# Patient Record
Sex: FEMALE | URBAN - METROPOLITAN AREA
[De-identification: names, ages, dates, MRNs, and addresses within clinical notes are randomized per-mention and may not be internally consistent; named-entity substitution may affect disease eponyms.]

---

## 2018-02-26 ENCOUNTER — APPOINTMENT (RX ONLY)
Age: 63
Setting detail: DERMATOLOGY
End: 2018-02-26

## 2018-02-26 DIAGNOSIS — D22 MELANOCYTIC NEVI: ICD-10-CM

## 2018-02-26 DIAGNOSIS — L72.8 OTHER FOLLICULAR CYSTS OF THE SKIN AND SUBCUTANEOUS TISSUE: ICD-10-CM

## 2018-02-26 DIAGNOSIS — D86.3 SARCOIDOSIS OF SKIN: ICD-10-CM

## 2018-02-26 DIAGNOSIS — L81.4 OTHER MELANIN HYPERPIGMENTATION: ICD-10-CM

## 2018-02-26 PROBLEM — D22.5 MELANOCYTIC NEVI OF TRUNK: Status: ACTIVE | Noted: 2018-02-26

## 2018-02-26 PROCEDURE — 99203 OFFICE O/P NEW LOW 30 MIN: CPT

## 2018-02-26 PROCEDURE — ? COUNSELING

## 2018-02-26 PROCEDURE — ? PRESCRIPTION

## 2018-02-26 PROCEDURE — ? DIAGNOSIS COMMENT

## 2018-02-26 RX ORDER — HYDROXYCHLOROQUINE SULFATE 200 MG/1
TABLET ORAL
Qty: 30 | Refills: 4 | Status: ERX | COMMUNITY
Start: 2018-02-26

## 2018-02-26 RX ADMIN — HYDROXYCHLOROQUINE SULFATE: 200 TABLET ORAL at 00:00

## 2018-02-26 ASSESSMENT — LOCATION DETAILED DESCRIPTION DERM
LOCATION DETAILED: LEFT CENTRAL OCCIPITAL SCALP
LOCATION DETAILED: STERNAL NOTCH
LOCATION DETAILED: INFERIOR THORACIC SPINE

## 2018-02-26 ASSESSMENT — LOCATION ZONE DERM
LOCATION ZONE: SCALP
LOCATION ZONE: TRUNK

## 2018-02-26 ASSESSMENT — LOCATION SIMPLE DESCRIPTION DERM
LOCATION SIMPLE: UPPER BACK
LOCATION SIMPLE: SCALP
LOCATION SIMPLE: CHEST

## 2018-02-26 NOTE — HPI: FREE FORM (INITIAL HISTORY)
How Severe Is Your Skin Condition? (The Patient Describes The Severity Level As....): mild
What Brings You In Today? (This Is An Xx Year Old Patient Who Presents For...): sarcoidosis
When Did You First Notice It? (The Patient First Noticed It...): years ago
Where On Your Body Is It? (Located On...): currently she does not have any outbreaks
Any Associated Symptoms? (The Symptoms Include.....): previously raised bumps, none today
What Previous Treatments Have You Tried? (The Patient Has Tried The Following Treatments...): prednisone, ILK, currently on 200 mg of Plaquenil daily
Did Anything Happen To Make You Want To Come In For This Specifically Today? (The Specific Reason That The Patient Came In Today Was Because:): since she has not had any cutaneous flares in over 2.5 years, she would like to discuss whether she needs to continue Plaquenil. She has never had sarcoidosis in any other organ, other than her skin. She was previously followed by Dr. Angelo Lindo at .\\nShe would also like a full skin exam. She would like to discuss a long-standing cyst on her scalp.

## 2018-02-26 NOTE — PROCEDURE: DIAGNOSIS COMMENT
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  Would refer to Dr. Lawson for excision if she would like to have it removed. She will call back to schedule if she would like. She will otherwise notify clinic with changes.
Comment: She does not have any lesions of sarcoidosis on her skin today and has not for over 2.5 years.\\n- decrease Plaquenil to 200 mg every other day. Can increase back to daily if she flares\\n- continue annual eye exams\\n- check labs\\n- f/u 6 months, sooner if needed
Detail Level: Generalized
Detail Level: Zone

## 2018-03-13 ENCOUNTER — APPOINTMENT (RX ONLY)
Age: 63
Setting detail: DERMATOLOGY
End: 2018-03-13

## 2018-03-13 DIAGNOSIS — Z79.899 OTHER LONG TERM (CURRENT) DRUG THERAPY: ICD-10-CM

## 2018-03-13 PROCEDURE — ? ORDER TESTS

## 2018-04-12 ENCOUNTER — APPOINTMENT (RX ONLY)
Age: 63
Setting detail: DERMATOLOGY
End: 2018-04-12

## 2018-04-12 DIAGNOSIS — L72.8 OTHER FOLLICULAR CYSTS OF THE SKIN AND SUBCUTANEOUS TISSUE: ICD-10-CM

## 2018-04-12 PROCEDURE — 12031 INTMD RPR S/A/T/EXT 2.5 CM/<: CPT

## 2018-04-12 PROCEDURE — 11422 EXC H-F-NK-SP B9+MARG 1.1-2: CPT

## 2018-04-12 PROCEDURE — ? PATIENT SPECIFIC COUNSELING

## 2018-04-12 PROCEDURE — ? EXCISION

## 2018-04-12 PROCEDURE — ? PRESCRIPTION

## 2018-04-12 RX ORDER — HYDROCODONE BITATRATE AND ACETAMINOPHEN 5; 325 MG/1; MG/1
TABLET ORAL
Qty: 4 | Refills: 0 | COMMUNITY
Start: 2018-04-12

## 2018-04-12 RX ADMIN — HYDROCODONE BITATRATE AND ACETAMINOPHEN: 5; 325 TABLET ORAL at 00:00

## 2018-04-12 ASSESSMENT — LOCATION DETAILED DESCRIPTION DERM: LOCATION DETAILED: LEFT CENTRAL OCCIPITAL SCALP

## 2018-04-12 ASSESSMENT — LOCATION SIMPLE DESCRIPTION DERM: LOCATION SIMPLE: SCALP

## 2018-04-12 ASSESSMENT — LOCATION ZONE DERM: LOCATION ZONE: SCALP

## 2018-04-25 ENCOUNTER — APPOINTMENT (RX ONLY)
Age: 63
Setting detail: DERMATOLOGY
End: 2018-04-25

## 2018-04-25 DIAGNOSIS — Z48.02 ENCOUNTER FOR REMOVAL OF SUTURES: ICD-10-CM

## 2018-04-25 PROCEDURE — 99211 OFF/OP EST MAY X REQ PHY/QHP: CPT

## 2018-04-25 PROCEDURE — ? SUTURE REMOVAL (NO GLOBAL PERIOD)

## 2018-04-25 ASSESSMENT — LOCATION SIMPLE DESCRIPTION DERM: LOCATION SIMPLE: SCALP

## 2018-04-25 ASSESSMENT — LOCATION DETAILED DESCRIPTION DERM: LOCATION DETAILED: LEFT CENTRAL OCCIPITAL SCALP

## 2018-04-25 ASSESSMENT — LOCATION ZONE DERM: LOCATION ZONE: SCALP

## 2018-09-04 ENCOUNTER — APPOINTMENT (RX ONLY)
Age: 63
Setting detail: DERMATOLOGY
End: 2018-09-04

## 2018-09-04 DIAGNOSIS — D86.3 SARCOIDOSIS OF SKIN: ICD-10-CM

## 2018-09-04 DIAGNOSIS — L82.1 OTHER SEBORRHEIC KERATOSIS: ICD-10-CM

## 2018-09-04 PROCEDURE — ? DIAGNOSIS COMMENT

## 2018-09-04 PROCEDURE — 99213 OFFICE O/P EST LOW 20 MIN: CPT

## 2018-09-04 PROCEDURE — ? PRESCRIPTION

## 2018-09-04 PROCEDURE — ? COUNSELING

## 2018-09-04 RX ORDER — HYDROXYCHLOROQUINE SULFATE 200 MG/1
TABLET ORAL
Qty: 30 | Refills: 4 | Status: ERX

## 2018-09-04 ASSESSMENT — LOCATION SIMPLE DESCRIPTION DERM: LOCATION SIMPLE: LEFT CALF

## 2018-09-04 ASSESSMENT — LOCATION ZONE DERM: LOCATION ZONE: LEG

## 2018-09-04 ASSESSMENT — LOCATION DETAILED DESCRIPTION DERM: LOCATION DETAILED: LEFT DISTAL CALF

## 2018-09-04 NOTE — PROCEDURE: DIAGNOSIS COMMENT
Detail Level: Generalized
Detail Level: Simple
Comment: The lesion she thought was a new sarcoid lesion is most consistent with a small SK. Notify clinic with changes.
Comment: She does not have any lesions of sarcoidosis on her skin today and has not for over 3 years. Pt was using 200mg every other day then increased to daily because she was concerned a lesion on her left calf was a sarcoidosis lesion.\\n- decrease Plaquenil to 200 mg every other day again. Can increase back to daily if she flares\\n- continue monitoring with pulmonology and ophthalmology \\n- f/u 6 months, sooner if needed. \\n- will check labs in 6 months at next exam.

## 2018-09-04 NOTE — HPI: FREE FORM (FOLLOW UP HISTORY)
What Brings You In Today For Follow Up? (This Is An Xx Year Old Patient Who Is Following Up For:): cutaneous sarcoidosis
What Was It Treated With? (The Condition Was Treated With:): Plaquenil
How Did You Use It? (The Patient Used The Treatment In The Following Way:): 200mg qd since April
Since The Treatment Is Your Condition Better, Worse, Or Unchanged? (Since The Treatment, The Condition Is:): stable, no lesions

## 2019-04-23 ENCOUNTER — APPOINTMENT (RX ONLY)
Age: 64
Setting detail: DERMATOLOGY
End: 2019-04-23

## 2019-04-23 DIAGNOSIS — Z79.899 OTHER LONG TERM (CURRENT) DRUG THERAPY: ICD-10-CM

## 2019-04-23 DIAGNOSIS — L81.4 OTHER MELANIN HYPERPIGMENTATION: ICD-10-CM

## 2019-04-23 DIAGNOSIS — D86.3 SARCOIDOSIS OF SKIN: ICD-10-CM

## 2019-04-23 DIAGNOSIS — D22 MELANOCYTIC NEVI: ICD-10-CM

## 2019-04-23 PROBLEM — D22.5 MELANOCYTIC NEVI OF TRUNK: Status: ACTIVE | Noted: 2019-04-23

## 2019-04-23 PROCEDURE — ? ORDER TESTS

## 2019-04-23 PROCEDURE — ? OTHER

## 2019-04-23 PROCEDURE — ? DIAGNOSIS COMMENT

## 2019-04-23 PROCEDURE — ? COUNSELING

## 2019-04-23 PROCEDURE — 99214 OFFICE O/P EST MOD 30 MIN: CPT

## 2019-04-23 PROCEDURE — ? PRESCRIPTION

## 2019-04-23 RX ORDER — HYDROXYCHLOROQUINE SULFATE 200 MG/1
TABLET ORAL
Qty: 45 | Refills: 1 | Status: ERX

## 2019-04-23 ASSESSMENT — LOCATION ZONE DERM
LOCATION ZONE: TRUNK
LOCATION ZONE: ARM

## 2019-04-23 ASSESSMENT — LOCATION DETAILED DESCRIPTION DERM
LOCATION DETAILED: LEFT SUPERIOR UPPER BACK
LOCATION DETAILED: RIGHT POSTERIOR SHOULDER

## 2019-04-23 ASSESSMENT — LOCATION SIMPLE DESCRIPTION DERM
LOCATION SIMPLE: RIGHT SHOULDER
LOCATION SIMPLE: LEFT UPPER BACK

## 2019-04-23 NOTE — PROCEDURE: DIAGNOSIS COMMENT
Detail Level: Simple
Comment: No lesions c/w sarcoidosis. Decrease Plaquenil from every other day to M-W-F (small decrease.) Notify clinic with new lesions. Check labs.

## 2019-04-23 NOTE — PROCEDURE: OTHER
Detail Level: Zone
Note Text (......Xxx Chief Complaint.): This diagnosis correlates with the
Other (Free Text): --some nevi have some mild clinical atypia but none appear concerning for melanoma

## 2019-04-23 NOTE — HPI: FREE FORM (FOLLOW UP HISTORY)
How Severe Is Your Skin Condition?: mild
What Brings You In Today For Follow Up? (This Is An Xx Year Old Patient Who Is Following Up For:): sarcoidosis
What Was It Treated With? (The Condition Was Treated With:): Plaquenil 200 mg every other day
Did You Use The Treatment As Prescribed Or Directed? (The Patient Used The Treatment As Prescribed Or Directed): yes
Since The Treatment Is Your Condition Better, Worse, Or Unchanged? (Since The Treatment, The Condition Is:): stable, no lesions. She has not had any lesions for years.

## 2019-10-31 ENCOUNTER — APPOINTMENT (RX ONLY)
Age: 64
Setting detail: DERMATOLOGY
End: 2019-10-31

## 2019-10-31 DIAGNOSIS — D86.3 SARCOIDOSIS OF SKIN: ICD-10-CM

## 2019-10-31 DIAGNOSIS — Z79.899 OTHER LONG TERM (CURRENT) DRUG THERAPY: ICD-10-CM

## 2019-10-31 PROCEDURE — 99213 OFFICE O/P EST LOW 20 MIN: CPT

## 2019-10-31 PROCEDURE — ? DIAGNOSIS COMMENT

## 2019-10-31 PROCEDURE — ? OTHER

## 2019-10-31 NOTE — HPI: FREE FORM (FOLLOW UP HISTORY)
How Severe Is Your Skin Condition?: mild
What Brings You In Today For Follow Up? (This Is An Xx Year Old Patient Who Is Following Up For:): Sarcoidosis
What Was It Treated With? (The Condition Was Treated With:): Plaquenil 200mg M-W-F
Since The Treatment Is Your Condition Better, Worse, Or Unchanged? (Since The Treatment, The Condition Is:): Stable, she has not had any breakouts since lowering her dose.  She was previously on 200mg every other day.

## 2019-10-31 NOTE — PROCEDURE: OTHER
Note Text (......Xxx Chief Complaint.): This diagnosis correlates with the
Detail Level: Zone
Other (Free Text): --last labs basically normal (lat June.) Will check again in 6 months. She will continue to see ophthalmology every year. SEs of Plaquenil reviewed.

## 2019-10-31 NOTE — PROCEDURE: DIAGNOSIS COMMENT
Detail Level: Simple
Comment: No lesions c/w sarcoidosis. Continue Plaquenil M-W-F.  Notify clinic with new lesions. We will most likely lower her to 200mg twice a week in 6 months. Will check labs at that time. Her pulmonologist recently told her she could f/u as needed. She continues to see ophthalmology annually.

## 2020-03-24 ENCOUNTER — APPOINTMENT (RX ONLY)
Age: 65
Setting detail: DERMATOLOGY
End: 2020-03-24

## 2020-03-24 DIAGNOSIS — D86.3 SARCOIDOSIS OF SKIN: ICD-10-CM

## 2020-03-24 PROCEDURE — ? PRESCRIPTION

## 2020-03-24 RX ORDER — HYDROXYCHLOROQUINE SULFATE 200 MG/1
TABLET ORAL
Qty: 15 | Refills: 4 | Status: ERX

## 2020-05-05 ENCOUNTER — APPOINTMENT (RX ONLY)
Age: 65
Setting detail: DERMATOLOGY
End: 2020-05-05

## 2020-05-05 DIAGNOSIS — D86.3 SARCOIDOSIS OF SKIN: ICD-10-CM

## 2020-05-05 DIAGNOSIS — L85.3 XEROSIS CUTIS: ICD-10-CM

## 2020-05-05 DIAGNOSIS — D22 MELANOCYTIC NEVI: ICD-10-CM

## 2020-05-05 DIAGNOSIS — L82.1 OTHER SEBORRHEIC KERATOSIS: ICD-10-CM

## 2020-05-05 DIAGNOSIS — Z79.899 OTHER LONG TERM (CURRENT) DRUG THERAPY: ICD-10-CM

## 2020-05-05 PROBLEM — D22.5 MELANOCYTIC NEVI OF TRUNK: Status: ACTIVE | Noted: 2020-05-05

## 2020-05-05 PROCEDURE — ? COUNSELING

## 2020-05-05 PROCEDURE — 99214 OFFICE O/P EST MOD 30 MIN: CPT

## 2020-05-05 PROCEDURE — ? DIAGNOSIS COMMENT

## 2020-05-05 PROCEDURE — ? ORDER TESTS

## 2020-05-05 ASSESSMENT — LOCATION SIMPLE DESCRIPTION DERM
LOCATION SIMPLE: ABDOMEN
LOCATION SIMPLE: RIGHT UPPER BACK

## 2020-05-05 ASSESSMENT — LOCATION DETAILED DESCRIPTION DERM
LOCATION DETAILED: RIGHT SUPERIOR MEDIAL UPPER BACK
LOCATION DETAILED: RIGHT MID-UPPER BACK
LOCATION DETAILED: PERIUMBILICAL SKIN

## 2020-05-05 ASSESSMENT — LOCATION ZONE DERM: LOCATION ZONE: TRUNK

## 2020-05-05 NOTE — HPI: FREE FORM (INITIAL HISTORY)
How Severe Is Your Skin Condition? (The Patient Describes The Severity Level As....): moderate
What Brings You In Today? (This Is An Xx Year Old Patient Who Presents For...): rough patches on back
When Did You First Notice It? (The Patient First Noticed It...): weeks ago
Where On Your Body Is It? (Located On...): upper back
Any Associated Symptoms? (The Symptoms Include.....): dry, raised, rough
What Previous Treatments Have You Tried? (The Patient Has Tried The Following Treatments...): OTC moisturizers
Did Anything Happen To Make You Want To Come In For This Specifically Today? (The Specific Reason That The Patient Came In Today Was Because:): evaluation and management, a FSE and f/u on sarcoidosis for which she takes hydroxychloroquine. She takes the hydroxychloroquine M-W-F and the sarcoidosis has been stable. She does not think the new patches on her back are sarcoid, but she would like the area checked.

## 2020-05-05 NOTE — PROCEDURE: DIAGNOSIS COMMENT
Detail Level: Simple
Comment: No current lesions. Continue Plaquenil M-W-F.  Will hold off on changing dose now, will consider again in future. Notify clinic with new lesions. Check labs in next few weeks. Lab slip given. Continue annual eye exams.
Detail Level: Zone
Comment: Patches on upper back are due to xerosis or possibly previous inflamed SKs. Not clinically consistent with sarcoidosis. Notify clinic if these recur. Much better now.

## 2021-04-02 ENCOUNTER — APPOINTMENT (RX ONLY)
Age: 66
Setting detail: DERMATOLOGY
End: 2021-04-02

## 2021-04-02 DIAGNOSIS — D86.3 SARCOIDOSIS OF SKIN: ICD-10-CM

## 2021-04-02 PROCEDURE — ? PRESCRIPTION

## 2021-04-02 RX ORDER — HYDROXYCHLOROQUINE SULFATE 200 MG/1
TABLET ORAL
Qty: 15 | Refills: 4 | Status: ERX

## 2021-04-20 ENCOUNTER — APPOINTMENT (RX ONLY)
Age: 66
Setting detail: DERMATOLOGY
End: 2021-04-20

## 2021-04-20 VITALS — TEMPERATURE: 97.1 F

## 2021-04-20 DIAGNOSIS — D86.3 SARCOIDOSIS OF SKIN: ICD-10-CM

## 2021-04-20 DIAGNOSIS — L81.4 OTHER MELANIN HYPERPIGMENTATION: ICD-10-CM

## 2021-04-20 DIAGNOSIS — L82.1 OTHER SEBORRHEIC KERATOSIS: ICD-10-CM

## 2021-04-20 DIAGNOSIS — Z79.899 OTHER LONG TERM (CURRENT) DRUG THERAPY: ICD-10-CM

## 2021-04-20 DIAGNOSIS — D49.2 NEOPLASM OF UNSPECIFIED BEHAVIOR OF BONE, SOFT TISSUE, AND SKIN: ICD-10-CM

## 2021-04-20 DIAGNOSIS — D22 MELANOCYTIC NEVI: ICD-10-CM

## 2021-04-20 PROBLEM — D22.5 MELANOCYTIC NEVI OF TRUNK: Status: ACTIVE | Noted: 2021-04-20

## 2021-04-20 PROCEDURE — ? OTHER

## 2021-04-20 PROCEDURE — 99213 OFFICE O/P EST LOW 20 MIN: CPT | Mod: 25

## 2021-04-20 PROCEDURE — ? DIAGNOSIS COMMENT

## 2021-04-20 PROCEDURE — ? COUNSELING

## 2021-04-20 PROCEDURE — ? BIOPSY BY SHAVE METHOD

## 2021-04-20 PROCEDURE — 11102 TANGNTL BX SKIN SINGLE LES: CPT

## 2021-04-20 PROCEDURE — ? ORDER TESTS

## 2021-04-20 ASSESSMENT — LOCATION DETAILED DESCRIPTION DERM
LOCATION DETAILED: LEFT CENTRAL MALAR CHEEK
LOCATION DETAILED: LEFT SUPERIOR PARIETAL SCALP
LOCATION DETAILED: RIGHT MEDIAL UPPER BACK
LOCATION DETAILED: STERNAL NOTCH
LOCATION DETAILED: INFERIOR THORACIC SPINE

## 2021-04-20 ASSESSMENT — LOCATION ZONE DERM
LOCATION ZONE: SCALP
LOCATION ZONE: TRUNK
LOCATION ZONE: FACE

## 2021-04-20 ASSESSMENT — LOCATION SIMPLE DESCRIPTION DERM
LOCATION SIMPLE: UPPER BACK
LOCATION SIMPLE: CHEST
LOCATION SIMPLE: LEFT CHEEK
LOCATION SIMPLE: RIGHT UPPER BACK
LOCATION SIMPLE: SCALP

## 2021-04-20 NOTE — PROCEDURE: OTHER
Render Risk Assessment In Note?: no
Detail Level: Simple
Other (Free Text): she will get labs done in the next couple of months
Note Text (......Xxx Chief Complaint.): This diagnosis correlates with the

## 2021-04-20 NOTE — PROCEDURE: HIGH RISK MEDICATION MONITORING
Skyrizi Counseling: I discussed with the patient the risks of risankizumab-rzaa including but not limited to immunosuppression, and serious infections.  The patient understands that monitoring is required including a PPD at baseline and must alert us or the primary physician if symptoms of infection or other concerning signs are noted.
Drysol Counseling:  I discussed with the patient the risks of drysol/aluminum chloride including but not limited to skin rash, itching, irritation, burning.
Imiquimod Counseling:  I discussed with the patient the risks of imiquimod including but not limited to erythema, scaling, itching, weeping, crusting, and pain.  Patient understands that the inflammatory response to imiquimod is variable from person to person and was educated regarded proper titration schedule.  If flu-like symptoms develop, patient knows to discontinue the medication and contact us.
High Dose Vitamin A Counseling: Side effects reviewed, pt to contact office should one occur.
Ilumya Counseling: I discussed with the patient the risks of tildrakizumab including but not limited to immunosuppression, malignancy, posterior leukoencephalopathy syndrome, and serious infections.  The patient understands that monitoring is required including a PPD at baseline and must alert us or the primary physician if symptoms of infection or other concerning signs are noted.
Ilumya Pregnancy And Lactation Text: The risk during pregnancy and breastfeeding is uncertain with this medication.
Sarecycline Pregnancy And Lactation Text: This medication is Pregnancy Category D and not consider safe during pregnancy. It is also excreted in breast milk.
Otezla Pregnancy And Lactation Text: This medication is Pregnancy Category C and it isn't known if it is safe during pregnancy. It is unknown if it is excreted in breast milk.
Bexarotene Pregnancy And Lactation Text: This medication is Pregnancy Category X and should not be given to women who are pregnant or may become pregnant. This medication should not be used if you are breast feeding.
Cephalexin Counseling: I counseled the patient regarding use of cephalexin as an antibiotic for prophylactic and/or therapeutic purposes. Cephalexin (commonly prescribed under brand name Keflex) is a cephalosporin antibiotic which is active against numerous classes of bacteria, including most skin bacteria. Side effects may include nausea, diarrhea, gastrointestinal upset, rash, hives, yeast infections, and in rare cases, hepatitis, kidney disease, seizures, fever, confusion, neurologic symptoms, and others. Patients with severe allergies to penicillin medications are cautioned that there is about a 10% incidence of cross-reactivity with cephalosporins. When possible, patients with penicillin allergies should use alternatives to cephalosporins for antibiotic therapy.
Zyclara Pregnancy And Lactation Text: This medication is Pregnancy Category C. It is unknown if this medication is excreted in breast milk.
Dapsone Pregnancy And Lactation Text: This medication is Pregnancy Category C and is not considered safe during pregnancy or breast feeding.
Humira Pregnancy And Lactation Text: This medication is Pregnancy Category B and is considered safe during pregnancy. It is unknown if this medication is excreted in breast milk.
Isotretinoin Counseling: Patient should get monthly blood tests, not donate blood, not drive at night if vision affected, not share medication, and not undergo elective surgery for 6 months after tx completed. Side effects reviewed, pt to contact office should one occur.
Azithromycin Pregnancy And Lactation Text: This medication is considered safe during pregnancy and is also secreted in breast milk.
Benzoyl Peroxide Counseling: Patient counseled that medicine may cause skin irritation and bleach clothing.  In the event of skin irritation, the patient was advised to reduce the amount of the drug applied or use it less frequently.   The patient verbalized understanding of the proper use and possible adverse effects of benzoyl peroxide.  All of the patient's questions and concerns were addressed.
Acitretin Pregnancy And Lactation Text: This medication is Pregnancy Category X and should not be given to women who are pregnant or may become pregnant in the future. This medication is excreted in breast milk.
Topical Sulfur Applications Counseling: Topical Sulfur Counseling: Patient counseled that this medication may cause skin irritation or allergic reactions.  In the event of skin irritation, the patient was advised to reduce the amount of the drug applied or use it less frequently.   The patient verbalized understanding of the proper use and possible adverse effects of topical sulfur application.  All of the patient's questions and concerns were addressed.
SSKI Counseling:  I discussed with the patient the risks of SSKI including but not limited to thyroid abnormalities, metallic taste, GI upset, fever, headache, acne, arthralgias, paraesthesias, lymphadenopathy, easy bleeding, arrhythmias, and allergic reaction.
Carac Pregnancy And Lactation Text: This medication is Pregnancy Category X and contraindicated in pregnancy and in women who may become pregnant. It is unknown if this medication is excreted in breast milk.
Rituxan Counseling:  I discussed with the patient the risks of Rituxan infusions. Side effects can include infusion reactions, severe drug rashes including mucocutaneous reactions, reactivation of latent hepatitis and other infections and rarely progressive multifocal leukoencephalopathy.  All of the patient's questions and concerns were addressed.
Quinolones Pregnancy And Lactation Text: This medication is Pregnancy Category C and it isn't know if it is safe during pregnancy. It is also excreted in breast milk.
Cyclosporine Pregnancy And Lactation Text: This medication is Pregnancy Category C and it isn't know if it is safe during pregnancy. This medication is excreted in breast milk.
Hydroxychloroquine Counseling:  I discussed with the patient that a baseline ophthalmologic exam is needed at the start of therapy and every year thereafter while on therapy. A CBC may also be warranted for monitoring.  The side effects of this medication were discussed with the patient, including but not limited to agranulocytosis, aplastic anemia, seizures, rashes, retinopathy, and liver toxicity. Patient instructed to call the office should any adverse effect occur.  The patient verbalized understanding of the proper use and possible adverse effects of Plaquenil.  All the patient's questions and concerns were addressed.
Arava Pregnancy And Lactation Text: This medication is Pregnancy Category X and is absolutely contraindicated during pregnancy. It is unknown if it is excreted in breast milk.
Prednisone Counseling:  I discussed with the patient the risks of prolonged use of prednisone including but not limited to weight gain, insomnia, osteoporosis, mood changes, diabetes, susceptibility to infection, glaucoma and high blood pressure.  In cases where prednisone use is prolonged, patients should be monitored with blood pressure checks, serum glucose levels and an eye exam.  Additionally, the patient may need to be placed on GI prophylaxis, PCP prophylaxis, and calcium and vitamin D supplementation and/or a bisphosphonate.  The patient verbalized understanding of the proper use and the possible adverse effects of prednisone.  All of the patient's questions and concerns were addressed.
Valtrex Pregnancy And Lactation Text: this medication is Pregnancy Category B and is considered safe during pregnancy. This medication is not directly found in breast milk but it's metabolite acyclovir is present.
Topical Clindamycin Counseling: Patient counseled that this medication may cause skin irritation or allergic reactions.  In the event of skin irritation, the patient was advised to reduce the amount of the drug applied or use it less frequently.   The patient verbalized understanding of the proper use and possible adverse effects of clindamycin.  All of the patient's questions and concerns were addressed.
Birth Control Pills Pregnancy And Lactation Text: This medication should be avoided if pregnant and for the first 30 days post-partum.
Topical Clindamycin Pregnancy And Lactation Text: This medication is Pregnancy Category B and is considered safe during pregnancy. It is unknown if it is excreted in breast milk.
Dupixent Pregnancy And Lactation Text: This medication likely crosses the placenta but the risk for the fetus is uncertain. This medication is excreted in breast milk.
Picato Counseling:  I discussed with the patient the risks of Picato including but not limited to erythema, scaling, itching, weeping, crusting, and pain.
Solaraze Counseling:  I discussed with the patient the risks of Solaraze including but not limited to erythema, scaling, itching, weeping, crusting, and pain.
Colchicine Counseling:  Patient counseled regarding adverse effects including but not limited to stomach upset (nausea, vomiting, stomach pain, or diarrhea).  Patient instructed to limit alcohol consumption while taking this medication.  Colchicine may reduce blood counts especially with prolonged use.  The patient understands that monitoring of kidney function and blood counts may be required, especially at baseline. The patient verbalized understanding of the proper use and possible adverse effects of colchicine.  All of the patient's questions and concerns were addressed.
Spironolactone Counseling: Patient advised regarding risks of diarrhea, abdominal pain, hyperkalemia, birth defects (for female patients), liver toxicity and renal toxicity. The patient may need blood work to monitor liver and kidney function and potassium levels while on therapy. The patient verbalized understanding of the proper use and possible adverse effects of spironolactone.  All of the patient's questions and concerns were addressed.
Gabapentin Pregnancy And Lactation Text: This medication is Pregnancy Category C and isn't considered safe during pregnancy. It is excreted in breast milk.
Elidel Counseling: Patient may experience a mild burning sensation during topical application. Elidel is not approved in children less than 2 years of age. There have been case reports of hematologic and skin malignancies in patients using topical calcineurin inhibitors although causality is questionable.
Birth Control Pills Counseling: Birth Control Pill Counseling: I discussed with the patient the potential side effects of OCPs including but not limited to increased risk of stroke, heart attack, thrombophlebitis, deep venous thrombosis, hepatic adenomas, breast changes, GI upset, headaches, and depression.  The patient verbalized understanding of the proper use and possible adverse effects of OCPs. All of the patient's questions and concerns were addressed.
Ketoconazole Pregnancy And Lactation Text: This medication is Pregnancy Category C and it isn't know if it is safe during pregnancy. It is also excreted in breast milk and breast feeding isn't recommended.
Azithromycin Counseling:  I discussed with the patient the risks of azithromycin including but not limited to GI upset, allergic reaction, drug rash, diarrhea, and yeast infections.
Cyclophosphamide Pregnancy And Lactation Text: This medication is Pregnancy Category D and it isn't considered safe during pregnancy. This medication is excreted in breast milk.
Enbrel Counseling:  I discussed with the patient the risks of etanercept including but not limited to myelosuppression, immunosuppression, autoimmune hepatitis, demyelinating diseases, lymphoma, and infections.  The patient understands that monitoring is required including a PPD at baseline and must alert us or the primary physician if symptoms of infection or other concerning signs are noted.
Siliq Counseling:  I discussed with the patient the risks of Siliq including but not limited to new or worsening depression, suicidal thoughts and behavior, immunosuppression, malignancy, posterior leukoencephalopathy syndrome, and serious infections.  The patient understands that monitoring is required including a PPD at baseline and must alert us or the primary physician if symptoms of infection or other concerning signs are noted. There is also a special program designed to monitor depression which is required with Siliq.
Glycopyrrolate Pregnancy And Lactation Text: This medication is Pregnancy Category B and is considered safe during pregnancy. It is unknown if it is excreted breast milk.
Erivedge Counseling- I discussed with the patient the risks of Erivedge including but not limited to nausea, vomiting, diarrhea, constipation, weight loss, changes in the sense of taste, decreased appetite, muscle spasms, and hair loss.  The patient verbalized understanding of the proper use and possible adverse effects of Erivedge.  All of the patient's questions and concerns were addressed.
Albendazole Pregnancy And Lactation Text: This medication is Pregnancy Category C and it isn't known if it is safe during pregnancy. It is also excreted in breast milk.
Protopic Counseling: Patient may experience a mild burning sensation during topical application. Protopic is not approved in children less than 2 years of age. There have been case reports of hematologic and skin malignancies in patients using topical calcineurin inhibitors although causality is questionable.
Glycopyrrolate Counseling:  I discussed with the patient the risks of glycopyrrolate including but not limited to skin rash, drowsiness, dry mouth, difficulty urinating, and blurred vision.
Terbinafine Pregnancy And Lactation Text: This medication is Pregnancy Category B and is considered safe during pregnancy. It is also excreted in breast milk and breast feeding isn't recommended.
Eucrisa Pregnancy And Lactation Text: This medication has not been assigned a Pregnancy Risk Category but animal studies failed to show danger with the topical medication. It is unknown if the medication is excreted in breast milk.
Topical Sulfur Applications Pregnancy And Lactation Text: This medication is Pregnancy Category C and has an unknown safety profile during pregnancy. It is unknown if this topical medication is excreted in breast milk.
Doxycycline Counseling:  Patient counseled regarding possible photosensitivity and increased risk for sunburn.  Patient instructed to avoid sunlight, if possible.  When exposed to sunlight, patients should wear protective clothing, sunglasses, and sunscreen.  The patient was instructed to call the office immediately if the following severe adverse effects occur:  hearing changes, easy bruising/bleeding, severe headache, or vision changes.  The patient verbalized understanding of the proper use and possible adverse effects of doxycycline.  All of the patient's questions and concerns were addressed.
Minoxidil Counseling: Minoxidil is a topical medication which can increase blood flow where it is applied. It is uncertain how this medication increases hair growth. Side effects are uncommon and include stinging and allergic reactions.
Clindamycin Pregnancy And Lactation Text: This medication can be used in pregnancy if certain situations. Clindamycin is also present in breast milk.
Methotrexate Counseling:  Patient counseled regarding adverse effects of methotrexate including but not limited to nausea, vomiting, abnormalities in liver function tests. Patients may develop mouth sores, rash, diarrhea, and abnormalities in blood counts. The patient understands that monitoring is required including LFT's and blood counts.  There is a rare possibility of scarring of the liver and lung problems that can occur when taking methotrexate. Persistent nausea, loss of appetite, pale stools, dark urine, cough, and shortness of breath should be reported immediately. Patient advised to discontinue methotrexate treatment at least three months before attempting to become pregnant.  I discussed the need for folate supplements while taking methotrexate.  These supplements can decrease side effects during methotrexate treatment. The patient verbalized understanding of the proper use and possible adverse effects of methotrexate.  All of the patient's questions and concerns were addressed.
Stelara Counseling:  I discussed with the patient the risks of ustekinumab including but not limited to immunosuppression, malignancy, posterior leukoencephalopathy syndrome, and serious infections.  The patient understands that monitoring is required including a PPD at baseline and must alert us or the primary physician if symptoms of infection or other concerning signs are noted.
Metronidazole Counseling:  I discussed with the patient the risks of metronidazole including but not limited to seizures, nausea/vomiting, a metallic taste in the mouth, nausea/vomiting and severe allergy.
Dapsone Counseling: I discussed with the patient the risks of dapsone including but not limited to hemolytic anemia, agranulocytosis, rashes, methemoglobinemia, kidney failure, peripheral neuropathy, headaches, GI upset, and liver toxicity.  Patients who start dapsone require monitoring including baseline LFTs and weekly CBCs for the first month, then every month thereafter.  The patient verbalized understanding of the proper use and possible adverse effects of dapsone.  All of the patient's questions and concerns were addressed.
Hydroxyzine Pregnancy And Lactation Text: This medication is not safe during pregnancy and should not be taken. It is also excreted in breast milk and breast feeding isn't recommended.
Nsaids Pregnancy And Lactation Text: These medications are considered safe up to 30 weeks gestation. It is excreted in breast milk.
Tazorac Counseling:  Patient advised that medication is irritating and drying.  Patient may need to apply sparingly and wash off after an hour before eventually leaving it on overnight.  The patient verbalized understanding of the proper use and possible adverse effects of tazorac.  All of the patient's questions and concerns were addressed.
Zyclara Counseling:  I discussed with the patient the risks of imiquimod including but not limited to erythema, scaling, itching, weeping, crusting, and pain.  Patient understands that the inflammatory response to imiquimod is variable from person to person and was educated regarded proper titration schedule.  If flu-like symptoms develop, patient knows to discontinue the medication and contact us.
Erythromycin Counseling:  I discussed with the patient the risks of erythromycin including but not limited to GI upset, allergic reaction, drug rash, diarrhea, increase in liver enzymes, and yeast infections.
Hydroxychloroquine Pregnancy And Lactation Text: This medication has been shown to cause fetal harm but it isn't assigned a Pregnancy Risk Category. There are small amounts excreted in breast milk.
Protopic Pregnancy And Lactation Text: This medication is Pregnancy Category C. It is unknown if this medication is excreted in breast milk when applied topically.
Detail Level: Zone
5-Fu Counseling: 5-Fluorouracil Counseling:  I discussed with the patient the risks of 5-fluorouracil including but not limited to erythema, scaling, itching, weeping, crusting, and pain.
Bexarotene Counseling:  I discussed with the patient the risks of bexarotene including but not limited to hair loss, dry lips/skin/eyes, liver abnormalities, hyperlipidemia, pancreatitis, depression/suicidal ideation, photosensitivity, drug rash/allergic reactions, hypothyroidism, anemia, leukopenia, infection, cataracts, and teratogenicity.  Patient understands that they will need regular blood tests to check lipid profile, liver function tests, white blood cell count, thyroid function tests and pregnancy test if applicable.
Humira Counseling:  I discussed with the patient the risks of adalimumab including but not limited to myelosuppression, immunosuppression, autoimmune hepatitis, demyelinating diseases, lymphoma, and serious infections.  The patient understands that monitoring is required including a PPD at baseline and must alert us or the primary physician if symptoms of infection or other concerning signs are noted.
Cosentyx Counseling:  I discussed with the patient the risks of Cosentyx including but not limited to worsening of Crohn's disease, immunosuppression, allergic reactions and infections.  The patient understands that monitoring is required including a PPD at baseline and must alert us or the primary physician if symptoms of infection or other concerning signs are noted.
Ketoconazole Counseling:   Patient counseled regarding improving absorption with orange juice.  Adverse effects include but are not limited to breast enlargement, headache, diarrhea, nausea, upset stomach, liver function test abnormalities, taste disturbance, and stomach pain.  There is a rare possibility of liver failure that can occur when taking ketoconazole. The patient understands that monitoring of LFTs may be required, especially at baseline. The patient verbalized understanding of the proper use and possible adverse effects of ketoconazole.  All of the patient's questions and concerns were addressed.
Itraconazole Counseling:  I discussed with the patient the risks of itraconazole including but not limited to liver damage, nausea/vomiting, neuropathy, and severe allergy.  The patient understands that this medication is best absorbed when taken with acidic beverages such as non-diet cola or ginger ale.  The patient understands that monitoring is required including baseline LFTs and repeat LFTs at intervals.  The patient understands that they are to contact us or the primary physician if concerning signs are noted.
Griseofulvin Counseling:  I discussed with the patient the risks of griseofulvin including but not limited to photosensitivity, cytopenia, liver damage, nausea/vomiting and severe allergy.  The patient understands that this medication is best absorbed when taken with a fatty meal (e.g., ice cream or french fries).
Sski Pregnancy And Lactation Text: This medication is Pregnancy Category D and isn't considered safe during pregnancy. It is excreted in breast milk.
Metronidazole Pregnancy And Lactation Text: This medication is Pregnancy Category B and considered safe during pregnancy.  It is also excreted in breast milk.
Gabapentin Counseling: I discussed with the patient the risks of gabapentin including but not limited to dizziness, somnolence, fatigue and ataxia.
Xeljanz Counseling: I discussed with the patient the risks of Xeljanz therapy including increased risk of infection, liver issues, headache, diarrhea, or cold symptoms. Live vaccines should be avoided. They were instructed to call if they have any problems.
Spironolactone Pregnancy And Lactation Text: This medication can cause feminization of the male fetus and should be avoided during pregnancy. The active metabolite is also found in breast milk.
Erythromycin Pregnancy And Lactation Text: This medication is Pregnancy Category B and is considered safe during pregnancy. It is also excreted in breast milk.
Simponi Counseling:  I discussed with the patient the risks of golimumab including but not limited to myelosuppression, immunosuppression, autoimmune hepatitis, demyelinating diseases, lymphoma, and serious infections.  The patient understands that monitoring is required including a PPD at baseline and must alert us or the primary physician if symptoms of infection or other concerning signs are noted.
Solaraze Pregnancy And Lactation Text: This medication is Pregnancy Category B and is considered safe. There is some data to suggest avoiding during the third trimester. It is unknown if this medication is excreted in breast milk.
Rituxan Pregnancy And Lactation Text: This medication is Pregnancy Category C and it isn't know if it is safe during pregnancy. It is unknown if this medication is excreted in breast milk but similar antibodies are known to be excreted.
Carac Counseling:  I discussed with the patient the risks of Carac including but not limited to erythema, scaling, itching, weeping, crusting, and pain.
Cellcept Counseling:  I discussed with the patient the risks of mycophenolate mofetil including but not limited to infection/immunosuppression, GI upset, hypokalemia, hypercholesterolemia, bone marrow suppression, lymphoproliferative disorders, malignancy, GI ulceration/bleed/perforation, colitis, interstitial lung disease, kidney failure, progressive multifocal leukoencephalopathy, and birth defects.  The patient understands that monitoring is required including a baseline creatinine and regular CBC testing. In addition, patient must alert us immediately if symptoms of infection or other concerning signs are noted.
Cimzia Counseling:  I discussed with the patient the risks of Cimzia including but not limited to immunosuppression, allergic reactions and infections.  The patient understands that monitoring is required including a PPD at baseline and must alert us or the primary physician if symptoms of infection or other concerning signs are noted.
Benzoyl Peroxide Pregnancy And Lactation Text: This medication is Pregnancy Category C. It is unknown if benzoyl peroxide is excreted in breast milk.
Valtrex Counseling: I discussed with the patient the risks of valacyclovir including but not limited to kidney damage, nausea, vomiting and severe allergy.  The patient understands that if the infection seems to be worsening or is not improving, they are to call.
Odomzo Counseling- I discussed with the patient the risks of Odomzo including but not limited to nausea, vomiting, diarrhea, constipation, weight loss, changes in the sense of taste, decreased appetite, muscle spasms, and hair loss.  The patient verbalized understanding of the proper use and possible adverse effects of Odomzo.  All of the patient's questions and concerns were addressed.
Griseofulvin Pregnancy And Lactation Text: This medication is Pregnancy Category X and is known to cause serious birth defects. It is unknown if this medication is excreted in breast milk but breast feeding should be avoided.
Hydroxyzine Counseling: Patient advised that the medication is sedating and not to drive a car after taking this medication.  Patient informed of potential adverse effects including but not limited to dry mouth, urinary retention, and blurry vision.  The patient verbalized understanding of the proper use and possible adverse effects of hydroxyzine.  All of the patient's questions and concerns were addressed.
Use Enhanced Medication Counseling?: No
Cyclophosphamide Counseling:  I discussed with the patient the risks of cyclophosphamide including but not limited to hair loss, hormonal abnormalities, decreased fertility, abdominal pain, diarrhea, nausea and vomiting, bone marrow suppression and infection. The patient understands that monitoring is required while taking this medication.
Isotretinoin Pregnancy And Lactation Text: This medication is Pregnancy Category X and is considered extremely dangerous during pregnancy. It is unknown if it is excreted in breast milk.
Dupixent Counseling: I discussed with the patient the risks of dupilumab including but not limited to eye infection and irritation, cold sores, injection site reactions, worsening of asthma, allergic reactions and increased risk of parasitic infection.  Live vaccines should be avoided while taking dupilumab. Dupilumab will also interact with certain medications such as warfarin and cyclosporine. The patient understands that monitoring is required and they must alert us or the primary physician if symptoms of infection or other concerning signs are noted.
Xolair Pregnancy And Lactation Text: This medication is Pregnancy Category B and is considered safe during pregnancy. This medication is excreted in breast milk.
Eucrisa Counseling: Patient may experience a mild burning sensation during topical application. Eucrisa is not approved in children less than 2 years of age.
Clofazimine Counseling:  I discussed with the patient the risks of clofazimine including but not limited to skin and eye pigmentation, liver damage, nausea/vomiting, gastrointestinal bleeding and allergy.
Clindamycin Counseling: I counseled the patient regarding use of clindamycin as an antibiotic for prophylactic and/or therapeutic purposes. Clindamycin is active against numerous classes of bacteria, including skin bacteria. Side effects may include nausea, diarrhea, gastrointestinal upset, rash, hives, yeast infections, and in rare cases, colitis.
Oxybutynin Counseling:  I discussed with the patient the risks of oxybutynin including but not limited to skin rash, drowsiness, dry mouth, difficulty urinating, and blurred vision.
Taltz Counseling: I discussed with the patient the risks of ixekizumab including but not limited to immunosuppression, serious infections, worsening of inflammatory bowel disease and drug reactions.  The patient understands that monitoring is required including a PPD at baseline and must alert us or the primary physician if symptoms of infection or other concerning signs are noted.
Cimetidine Counseling:  I discussed with the patient the risks of Cimetidine including but not limited to gynecomastia, headache, diarrhea, nausea, drowsiness, arrhythmias, pancreatitis, skin rashes, psychosis, bone marrow suppression and kidney toxicity.
Thalidomide Counseling: I discussed with the patient the risks of thalidomide including but not limited to birth defects, anxiety, weakness, chest pain, dizziness, cough and severe allergy.
Arava Counseling:  Patient counseled regarding adverse effects of Arava including but not limited to nausea, vomiting, abnormalities in liver function tests. Patients may develop mouth sores, rash, diarrhea, and abnormalities in blood counts. The patient understands that monitoring is required including LFTs and blood counts.  There is a rare possibility of scarring of the liver and lung problems that can occur when taking methotrexate. Persistent nausea, loss of appetite, pale stools, dark urine, cough, and shortness of breath should be reported immediately. Patient advised to discontinue Arava treatment and consult with a physician prior to attempting conception. The patient will have to undergo a treatment to eliminate Arava from the body prior to conception.
Azathioprine Counseling:  I discussed with the patient the risks of azathioprine including but not limited to myelosuppression, immunosuppression, hepatotoxicity, lymphoma, and infections.  The patient understands that monitoring is required including baseline LFTs, Creatinine, possible TPMP genotyping and weekly CBCs for the first month and then every 2 weeks thereafter.  The patient verbalized understanding of the proper use and possible adverse effects of azathioprine.  All of the patient's questions and concerns were addressed.
Fluconazole Counseling:  Patient counseled regarding adverse effects of fluconazole including but not limited to headache, diarrhea, nausea, upset stomach, liver function test abnormalities, taste disturbance, and stomach pain.  There is a rare possibility of liver failure that can occur when taking fluconazole.  The patient understands that monitoring of LFTs and kidney function test may be required, especially at baseline. The patient verbalized understanding of the proper use and possible adverse effects of fluconazole.  All of the patient's questions and concerns were addressed.
Methotrexate Pregnancy And Lactation Text: This medication is Pregnancy Category X and is known to cause fetal harm. This medication is excreted in breast milk.
Hydroquinone Counseling:  Patient advised that medication may result in skin irritation, lightening (hypopigmentation), dryness, and burning.  In the event of skin irritation, the patient was advised to reduce the amount of the drug applied or use it less frequently.  Rarely, spots that are treated with hydroquinone can become darker (pseudoochronosis).  Should this occur, patient instructed to stop medication and call the office. The patient verbalized understanding of the proper use and possible adverse effects of hydroquinone.  All of the patient's questions and concerns were addressed.
Doxycycline Pregnancy And Lactation Text: This medication is Pregnancy Category D and not consider safe during pregnancy. It is also excreted in breast milk but is considered safe for shorter treatment courses.
Minocycline Counseling: Patient advised regarding possible photosensitivity and discoloration of the teeth, skin, lips, tongue and gums.  Patient instructed to avoid sunlight, if possible.  When exposed to sunlight, patients should wear protective clothing, sunglasses, and sunscreen.  The patient was instructed to call the office immediately if the following severe adverse effects occur:  hearing changes, easy bruising/bleeding, severe headache, or vision changes.  The patient verbalized understanding of the proper use and possible adverse effects of minocycline.  All of the patient's questions and concerns were addressed.
Cellcept Pregnancy And Lactation Text: This medication is Pregnancy Category D and isn't considered safe during pregnancy. It is unknown if this medication is excreted in breast milk.
Tetracycline Counseling: Patient counseled regarding possible photosensitivity and increased risk for sunburn.  Patient instructed to avoid sunlight, if possible.  When exposed to sunlight, patients should wear protective clothing, sunglasses, and sunscreen.  The patient was instructed to call the office immediately if the following severe adverse effects occur:  hearing changes, easy bruising/bleeding, severe headache, or vision changes.  The patient verbalized understanding of the proper use and possible adverse effects of tetracycline.  All of the patient's questions and concerns were addressed. Patient understands to avoid pregnancy while on therapy due to potential birth defects.
Rifampin Counseling: I discussed with the patient the risks of rifampin including but not limited to liver damage, kidney damage, red-orange body fluids, nausea/vomiting and severe allergy.
Topical Retinoid counseling:  Patient advised to apply a pea-sized amount only at bedtime and wait 30 minutes after washing their face before applying.  If too drying, patient may add a non-comedogenic moisturizer. The patient verbalized understanding of the proper use and possible adverse effects of retinoids.  All of the patient's questions and concerns were addressed.
Drysol Pregnancy And Lactation Text: This medication is considered safe during pregnancy and breast feeding.
Cephalexin Pregnancy And Lactation Text: This medication is Pregnancy Category B and considered safe during pregnancy.  It is also excreted in breast milk but can be used safely for shorter doses.
Nsaids Counseling: NSAID Counseling: I discussed with the patient that NSAIDs should be taken with food. Prolonged use of NSAIDs can result in the development of stomach ulcers.  Patient advised to stop taking NSAIDs if abdominal pain occurs.  The patient verbalized understanding of the proper use and possible adverse effects of NSAIDs.  All of the patient's questions and concerns were addressed.
Acitretin Counseling:  I discussed with the patient the risks of acitretin including but not limited to hair loss, dry lips/skin/eyes, liver damage, hyperlipidemia, depression/suicidal ideation, photosensitivity.  Serious rare side effects can include but are not limited to pancreatitis, pseudotumor cerebri, bony changes, clot formation/stroke/heart attack.  Patient understands that alcohol is contraindicated since it can result in liver toxicity and significantly prolong the elimination of the drug by many years.
Sarecycline Counseling: Patient advised regarding possible photosensitivity and discoloration of the teeth, skin, lips, tongue and gums.  Patient instructed to avoid sunlight, if possible.  When exposed to sunlight, patients should wear protective clothing, sunglasses, and sunscreen.  The patient was instructed to call the office immediately if the following severe adverse effects occur:  hearing changes, easy bruising/bleeding, severe headache, or vision changes.  The patient verbalized understanding of the proper use and possible adverse effects of sarecycline.  All of the patient's questions and concerns were addressed.
Albendazole Counseling:  I discussed with the patient the risks of albendazole including but not limited to cytopenia, kidney damage, nausea/vomiting and severe allergy.  The patient understands that this medication is being used in an off-label manner.
Tremfya Counseling: I discussed with the patient the risks of guselkumab including but not limited to immunosuppression, serious infections, worsening of inflammatory bowel disease and drug reactions.  The patient understands that monitoring is required including a PPD at baseline and must alert us or the primary physician if symptoms of infection or other concerning signs are noted.
Rifampin Pregnancy And Lactation Text: This medication is Pregnancy Category C and it isn't know if it is safe during pregnancy. It is also excreted in breast milk and should not be used if you are breast feeding.
Tazorac Pregnancy And Lactation Text: This medication is not safe during pregnancy. It is unknown if this medication is excreted in breast milk.
Terbinafine Counseling: Patient counseling regarding adverse effects of terbinafine including but not limited to headache, diarrhea, rash, upset stomach, liver function test abnormalities, itching, taste/smell disturbance, nausea, abdominal pain, and flatulence.  There is a rare possibility of liver failure that can occur when taking terbinafine.  The patient understands that a baseline LFT and kidney function test may be required. The patient verbalized understanding of the proper use and possible adverse effects of terbinafine.  All of the patient's questions and concerns were addressed.
High Dose Vitamin A Pregnancy And Lactation Text: High dose vitamin A therapy is contraindicated during pregnancy and breast feeding.
Bactrim Pregnancy And Lactation Text: This medication is Pregnancy Category D and is known to cause fetal risk.  It is also excreted in breast milk.
Doxepin Counseling:  Patient advised that the medication is sedating and not to drive a car after taking this medication. Patient informed of potential adverse effects including but not limited to dry mouth, urinary retention, and blurry vision.  The patient verbalized understanding of the proper use and possible adverse effects of doxepin.  All of the patient's questions and concerns were addressed.
Otezla Counseling: The side effects of Otezla were discussed with the patient, including but not limited to worsening or new depression, weight loss, diarrhea, nausea, upper respiratory tract infection, and headache. Patient instructed to call the office should any adverse effect occur.  The patient verbalized understanding of the proper use and possible adverse effects of Otezla.  All the patient's questions and concerns were addressed.
Bactrim Counseling:  I discussed with the patient the risks of sulfa antibiotics including but not limited to GI upset, allergic reaction, drug rash, diarrhea, dizziness, photosensitivity, and yeast infections.  Rarely, more serious reactions can occur including but not limited to aplastic anemia, agranulocytosis, methemoglobinemia, blood dyscrasias, liver or kidney failure, lung infiltrates or desquamative/blistering drug rashes.
Xolair Counseling:  Patient informed of potential adverse effects including but not limited to fever, muscle aches, rash and allergic reactions.  The patient verbalized understanding of the proper use and possible adverse effects of Xolair.  All of the patient's questions and concerns were addressed.
Doxepin Pregnancy And Lactation Text: This medication is Pregnancy Category C and it isn't known if it is safe during pregnancy. It is also excreted in breast milk and breast feeding isn't recommended.
Quinolones Counseling:  I discussed with the patient the risks of fluoroquinolones including but not limited to GI upset, allergic reaction, drug rash, diarrhea, dizziness, photosensitivity, yeast infections, liver function test abnormalities, tendonitis/tendon rupture.
Infliximab Counseling:  I discussed with the patient the risks of infliximab including but not limited to myelosuppression, immunosuppression, autoimmune hepatitis, demyelinating diseases, lymphoma, and serious infections.  The patient understands that monitoring is required including a PPD at baseline and must alert us or the primary physician if symptoms of infection or other concerning signs are noted.
Xeltarynz Pregnancy And Lactation Text: This medication is Pregnancy Category D and is not considered safe during pregnancy.  The risk during breast feeding is also uncertain.
Ivermectin Counseling:  Patient instructed to take medication on an empty stomach with a full glass of water.  Patient informed of potential adverse effects including but not limited to nausea, diarrhea, dizziness, itching, and swelling of the extremities or lymph nodes.  The patient verbalized understanding of the proper use and possible adverse effects of ivermectin.  All of the patient's questions and concerns were addressed.
Cyclosporine Counseling:  I discussed with the patient the risks of cyclosporine including but not limited to hypertension, gingival hyperplasia,myelosuppression, immunosuppression, liver damage, kidney damage, neurotoxicity, lymphoma, and serious infections. The patient understands that monitoring is required including baseline blood pressure, CBC, CMP, lipid panel and uric acid, and then 1-2 times monthly CMP and blood pressure.
Cimzia Pregnancy And Lactation Text: This medication crosses the placenta but can be considered safe in certain situations. Cimzia may be excreted in breast milk.

## 2021-04-20 NOTE — PROCEDURE: BIOPSY BY SHAVE METHOD
Was A Bandage Applied: Yes
Silver Nitrate Text: The wound bed was treated with silver nitrate after the biopsy was performed.
Hide Topical Anesthesia?: No
Electrodesiccation Text: The wound bed was treated with electrodesiccation after the biopsy was performed.
Anesthesia Type: 2% lidocaine with epinephrine
Size Of Lesion In Cm: 0
Electrodesiccation And Curettage Text: The wound bed was treated with electrodesiccation and curettage after the biopsy was performed.
Biopsy Type: H and E
Dressing: pressure dressing with telfa
Anesthesia Volume In Cc: 0.5
Body Location Override (Optional - Billing Will Still Be Based On Selected Body Map Location If Applicable): mid top scalp
Depth Of Biopsy: dermis
Hemostasis: Aluminum Chloride
Detail Level: Detailed
Information: Selecting Yes will display possible errors in your note based on the variables you have selected. This validation is only offered as a suggestion for you. PLEASE NOTE THAT THE VALIDATION TEXT WILL BE REMOVED WHEN YOU FINALIZE YOUR NOTE. IF YOU WANT TO FAX A PRELIMINARY NOTE YOU WILL NEED TO TOGGLE THIS TO 'NO' IF YOU DO NOT WANT IT IN YOUR FAXED NOTE.
Cryotherapy Text: The wound bed was treated with cryotherapy after the biopsy was performed.
Post-Care Instructions: Patient given \"[facility]\" handout for post care instructions.
Wound Care: Aquaphor
Consent: Verbal consent was obtained and risks were reviewed including but not limited to scarring, infection, bleeding, scabbing, incomplete removal, nerve damage and allergy to anesthesia.
Notification Instructions: Patient will be notified of biopsy results. However, patient instructed to call the office if not contacted within 2 weeks.
Biopsy Method: Dermablade
Type Of Destruction Used: Electrodesiccation and Curettage
Billing Type: Third-Party Bill

## 2021-04-20 NOTE — PROCEDURE: DIAGNOSIS COMMENT
Render Risk Assessment In Note?: no
Comment: no lesions. Tolerating the Plaquenil well.\\nDecrease to 200 mg 2x/week\\nNotify clinic with any new lesions/flares
Detail Level: Simple

## 2021-04-20 NOTE — HPI: FREE FORM (INITIAL HISTORY)
What Brings You In Today? (This Is An Xx Year Old Patient Who Presents For...): skin lesions
When Did You First Notice It? (The Patient First Noticed It...): years ago
Where On Your Body Is It? (Located On...): body throughout
Any Associated Symptoms? (The Symptoms Include.....): none are new or changing to her knowledge
What Previous Treatments Have You Tried? (The Patient Has Tried The Following Treatments...): no treatment
Did Anything Happen To Make You Want To Come In For This Specifically Today? (The Specific Reason That The Patient Came In Today Was Because:): evaluation and management\\nShe also has a hx of cutaneous sarcoidosis. She is on hydroxychloroquine (Plaquenil) 200 mg 3x/week

## 2021-07-30 ENCOUNTER — APPOINTMENT (RX ONLY)
Age: 66
Setting detail: DERMATOLOGY
End: 2021-07-30

## 2021-07-30 DIAGNOSIS — L30.9 DERMATITIS, UNSPECIFIED: ICD-10-CM

## 2021-07-30 PROCEDURE — ? DIAGNOSIS COMMENT

## 2021-07-30 PROCEDURE — ? COUNSELING

## 2021-07-30 PROCEDURE — 99213 OFFICE O/P EST LOW 20 MIN: CPT

## 2021-07-30 PROCEDURE — ? TREATMENT REGIMEN

## 2021-07-30 ASSESSMENT — LOCATION SIMPLE DESCRIPTION DERM
LOCATION SIMPLE: RIGHT CALF
LOCATION SIMPLE: LEFT ANKLE

## 2021-07-30 ASSESSMENT — LOCATION DETAILED DESCRIPTION DERM
LOCATION DETAILED: RIGHT DISTAL CALF
LOCATION DETAILED: LEFT POSTERIOR ANKLE

## 2021-07-30 ASSESSMENT — LOCATION ZONE DERM: LOCATION ZONE: LEG

## 2021-07-30 NOTE — HPI: FREE FORM (INITIAL HISTORY)
What Brings You In Today? (This Is An Xx Year Old Patient Who Presents For...): rash
When Did You First Notice It? (The Patient First Noticed It...): days ago (first noticed last Tuesday but may have been a few days before that)
Where On Your Body Is It? (Located On...): posterior lower legs (right worse than left)
Any Associated Symptoms? (The Symptoms Include.....): red, worsening but not spreading
What Previous Treatments Have You Tried? (The Patient Has Tried The Following Treatments...): JULY
Did Anything Happen To Make You Want To Come In For This Specifically Today? (The Specific Reason That The Patient Came In Today Was Because:): evaluation and management\\nSaishwarya is currently in the [place]'s.\\nSaishwarya did walk in wet sand last Saturday. No bug bites. rash is not itchy.\\n\\nThis is an audio-visual visit

## 2021-07-30 NOTE — PROCEDURE: TREATMENT REGIMEN
Detail Level: Zone
Initiate Treatment: clobetasol BID (she has some at home and will be home in 2 days)\\ndo not use over entire right leg, in case we need to bx\\Kimmy will contact me Sunday and will see her next week to consider bx if not improving

## 2021-07-30 NOTE — PROCEDURE: DIAGNOSIS COMMENT
Detail Level: Zone
Comment: erythematous papules which are more numerous over the last few days, but have not spread outside the original area. Worse on right than left. Not on anterior lower leg.\\nPossibly contact derm from walking in wet sand. The eruption does not extend above the pant line
Render Risk Assessment In Note?: no

## 2021-10-21 ENCOUNTER — APPOINTMENT (RX ONLY)
Age: 66
Setting detail: DERMATOLOGY
End: 2021-10-21

## 2021-10-21 DIAGNOSIS — L81.4 OTHER MELANIN HYPERPIGMENTATION: ICD-10-CM

## 2021-10-21 DIAGNOSIS — D22 MELANOCYTIC NEVI: ICD-10-CM

## 2021-10-21 DIAGNOSIS — Z86.006 PERSONAL HISTORY OF MELANOMA IN-SITU: ICD-10-CM

## 2021-10-21 DIAGNOSIS — D86.3 SARCOIDOSIS OF SKIN: ICD-10-CM

## 2021-10-21 DIAGNOSIS — L82.1 OTHER SEBORRHEIC KERATOSIS: ICD-10-CM

## 2021-10-21 DIAGNOSIS — L21.8 OTHER SEBORRHEIC DERMATITIS: ICD-10-CM

## 2021-10-21 PROBLEM — D22.5 MELANOCYTIC NEVI OF TRUNK: Status: ACTIVE | Noted: 2021-10-21

## 2021-10-21 PROBLEM — Z85.820 PERSONAL HISTORY OF MALIGNANT MELANOMA OF SKIN: Status: ACTIVE | Noted: 2021-10-21

## 2021-10-21 PROCEDURE — ? DIAGNOSIS COMMENT

## 2021-10-21 PROCEDURE — ? COUNSELING

## 2021-10-21 PROCEDURE — ? PRESCRIPTION

## 2021-10-21 PROCEDURE — 99213 OFFICE O/P EST LOW 20 MIN: CPT

## 2021-10-21 PROCEDURE — ? TREATMENT REGIMEN

## 2021-10-21 PROCEDURE — ? OTHER

## 2021-10-21 RX ORDER — FLUOCINOLONE ACETONIDE 0.11 MG/ML
OIL TOPICAL
Qty: 118.28 | Refills: 1 | Status: ERX | COMMUNITY
Start: 2021-10-21

## 2021-10-21 RX ADMIN — FLUOCINOLONE ACETONIDE: 0.11 OIL TOPICAL at 00:00

## 2021-10-21 ASSESSMENT — LOCATION DETAILED DESCRIPTION DERM
LOCATION DETAILED: POSTERIOR MID-PARIETAL SCALP
LOCATION DETAILED: RIGHT MEDIAL UPPER BACK
LOCATION DETAILED: LEFT CENTRAL MALAR CHEEK
LOCATION DETAILED: INFERIOR THORACIC SPINE
LOCATION DETAILED: STERNAL NOTCH

## 2021-10-21 ASSESSMENT — LOCATION SIMPLE DESCRIPTION DERM
LOCATION SIMPLE: LEFT CHEEK
LOCATION SIMPLE: POSTERIOR SCALP
LOCATION SIMPLE: RIGHT UPPER BACK
LOCATION SIMPLE: UPPER BACK
LOCATION SIMPLE: CHEST

## 2021-10-21 ASSESSMENT — LOCATION ZONE DERM
LOCATION ZONE: FACE
LOCATION ZONE: SCALP
LOCATION ZONE: TRUNK

## 2021-10-21 NOTE — PROCEDURE: TREATMENT REGIMEN
Continue Regimen: continue Plaquenil 200 mg 2x/week. If no outbreaks in the next 6 months, will consider decreasing to 200 mg once per week
Detail Level: Zone

## 2021-10-21 NOTE — PROCEDURE: OTHER
Note Text (......Xxx Chief Complaint.): This diagnosis correlates with the
Render Risk Assessment In Note?: no
Detail Level: Simple
Other (Free Text): lesion on left side of nose is consistent with a small flat SK or lentigo. Appears benign. Photo taken. Notify clinic with changes before next visit.

## 2021-10-21 NOTE — HPI: SKIN LESION
How Severe Is Your Skin Lesion?: moderate
Has Your Skin Lesion Been Treated?: not been treated
Is This A New Presentation, Or A Follow-Up?: Skin Lesion
Additional History: Cutaneous sarcoidosis has been stable, no outbreaks.  \\nScalp is still crusty from excision of atypical nevus.

## 2021-10-21 NOTE — PROCEDURE: DIAGNOSIS COMMENT
Comment: she has several crusty areas on top of the scalp (after the surgery this summer.)\\nRecommended fluocinolone a couple of nights per week, leave on overnight, wash out with sal acid shampoo in morning. This should gently help remove the scale
Detail Level: Simple
Render Risk Assessment In Note?: no
Comment: no skin lesions

## 2022-04-19 ENCOUNTER — APPOINTMENT (RX ONLY)
Age: 67
Setting detail: DERMATOLOGY
End: 2022-04-19

## 2022-04-19 DIAGNOSIS — D86.3 SARCOIDOSIS OF SKIN: ICD-10-CM

## 2022-04-19 DIAGNOSIS — Z86.006 PERSONAL HISTORY OF MELANOMA IN-SITU: ICD-10-CM

## 2022-04-19 DIAGNOSIS — L21.8 OTHER SEBORRHEIC DERMATITIS: ICD-10-CM

## 2022-04-19 DIAGNOSIS — D22 MELANOCYTIC NEVI: ICD-10-CM

## 2022-04-19 DIAGNOSIS — L81.4 OTHER MELANIN HYPERPIGMENTATION: ICD-10-CM

## 2022-04-19 PROBLEM — D22.5 MELANOCYTIC NEVI OF TRUNK: Status: ACTIVE | Noted: 2022-04-19

## 2022-04-19 PROCEDURE — ? TREATMENT REGIMEN

## 2022-04-19 PROCEDURE — 99213 OFFICE O/P EST LOW 20 MIN: CPT

## 2022-04-19 PROCEDURE — ? COUNSELING

## 2022-04-19 PROCEDURE — ? OTHER

## 2022-04-19 PROCEDURE — ? DIAGNOSIS COMMENT

## 2022-04-19 ASSESSMENT — LOCATION SIMPLE DESCRIPTION DERM
LOCATION SIMPLE: RIGHT UPPER BACK
LOCATION SIMPLE: POSTERIOR SCALP
LOCATION SIMPLE: CHEST

## 2022-04-19 ASSESSMENT — LOCATION DETAILED DESCRIPTION DERM
LOCATION DETAILED: STERNAL NOTCH
LOCATION DETAILED: POSTERIOR MID-PARIETAL SCALP
LOCATION DETAILED: RIGHT MEDIAL UPPER BACK

## 2022-04-19 ASSESSMENT — LOCATION ZONE DERM
LOCATION ZONE: TRUNK
LOCATION ZONE: SCALP

## 2022-04-19 NOTE — HPI: SKIN LESIONS
How Severe Is Your Skin Lesion?: moderate
Is This A New Presentation, Or A Follow-Up?: Skin Lesions
Additional History: Patient's scalp is still scabby. She found Derma-Smoothe oil to be too greasy.

## 2022-04-19 NOTE — PROCEDURE: OTHER
Note Text (......Xxx Chief Complaint.): This diagnosis correlates with the
Render Risk Assessment In Note?: no
Other (Free Text): no change noted in lesion on left side of nose (from photo)
Detail Level: Zone

## 2022-04-19 NOTE — PROCEDURE: DIAGNOSIS COMMENT
Render Risk Assessment In Note?: no
Comment: no skin lesions, notify clinic with flares after decreasing dose to once weekly\\nCheck labs\\nShe recently had her eyes checked for retinopathy and exam was normal
Detail Level: Simple
Comment: she still has several crusty areas on top of the scalp (after the surgery this summer.)\\nContinue fluocinolone without shower cap and sal shampoo in the morning

## 2023-01-04 ENCOUNTER — APPOINTMENT (RX ONLY)
Age: 68
Setting detail: DERMATOLOGY
End: 2023-01-04

## 2023-01-04 DIAGNOSIS — D22 MELANOCYTIC NEVI: ICD-10-CM

## 2023-01-04 DIAGNOSIS — Z86.006 PERSONAL HISTORY OF MELANOMA IN-SITU: ICD-10-CM

## 2023-01-04 DIAGNOSIS — L81.4 OTHER MELANIN HYPERPIGMENTATION: ICD-10-CM

## 2023-01-04 DIAGNOSIS — D86.3 SARCOIDOSIS OF SKIN: ICD-10-CM

## 2023-01-04 PROBLEM — D22.5 MELANOCYTIC NEVI OF TRUNK: Status: ACTIVE | Noted: 2023-01-04

## 2023-01-04 PROCEDURE — ? COUNSELING

## 2023-01-04 PROCEDURE — ? PRESCRIPTION

## 2023-01-04 PROCEDURE — 99214 OFFICE O/P EST MOD 30 MIN: CPT

## 2023-01-04 PROCEDURE — ? DIAGNOSIS COMMENT

## 2023-01-04 PROCEDURE — ? OTHER

## 2023-01-04 RX ORDER — HYDROXYCHLOROQUINE SULFATE 200 MG/1
TABLET, FILM COATED ORAL
Qty: 30 | Refills: 4 | Status: ERX | COMMUNITY
Start: 2023-01-04

## 2023-01-04 RX ADMIN — HYDROXYCHLOROQUINE SULFATE: 200 TABLET, FILM COATED ORAL at 00:00

## 2023-01-04 ASSESSMENT — LOCATION DETAILED DESCRIPTION DERM
LOCATION DETAILED: POSTERIOR MID-PARIETAL SCALP
LOCATION DETAILED: STERNAL NOTCH
LOCATION DETAILED: RIGHT MEDIAL UPPER BACK

## 2023-01-04 ASSESSMENT — LOCATION ZONE DERM
LOCATION ZONE: SCALP
LOCATION ZONE: TRUNK

## 2023-01-04 ASSESSMENT — LOCATION SIMPLE DESCRIPTION DERM
LOCATION SIMPLE: CHEST
LOCATION SIMPLE: POSTERIOR SCALP
LOCATION SIMPLE: RIGHT UPPER BACK

## 2023-01-04 NOTE — PROCEDURE: DIAGNOSIS COMMENT
Render Risk Assessment In Note?: no
Comment: no evidence of sarcoidosis on skin exam today (she has a new, small acneiform papule above right knee, present for 1 day. She thinks it is due to shaving and she will notify clinic if it does not resolve in a week). She will remain on hydroxychloroquine 200 mg once weekly. It is unlikely that the dizziness is caused by sarcoidosis (neurosarcoidosis) but she will continue work-up per her PCP. She will likely see a neurologist. She will discuss getting an EKG with her PCP (will cc her PCP this note.) She is not having any cardiac symptoms.\\nShe should f/u in 6 months, sooner if needed
Detail Level: Simple

## 2023-01-04 NOTE — PROCEDURE: OTHER
Render Risk Assessment In Note?: no
Detail Level: Zone
Other (Free Text): no change noted in lesion on left nose or right preauricular cheek from previous photos. Additional photo taken of right thigh
Note Text (......Xxx Chief Complaint.): This diagnosis correlates with the

## 2023-01-14 ENCOUNTER — APPOINTMENT (RX ONLY)
Age: 68
Setting detail: DERMATOLOGY
End: 2023-01-14

## 2023-01-14 DIAGNOSIS — D86.3 SARCOIDOSIS OF SKIN: ICD-10-CM

## 2023-01-14 PROCEDURE — ? PRESCRIPTION

## 2023-01-14 RX ORDER — HYDROXYCHLOROQUINE SULFATE 200 MG/1
TABLET, FILM COATED ORAL
Qty: 15 | Refills: 4 | Status: ERX

## 2023-09-06 ENCOUNTER — APPOINTMENT (RX ONLY)
Age: 68
Setting detail: DERMATOLOGY
End: 2023-09-06

## 2023-09-06 DIAGNOSIS — D86.3 SARCOIDOSIS OF SKIN: ICD-10-CM

## 2023-09-06 DIAGNOSIS — L81.4 OTHER MELANIN HYPERPIGMENTATION: ICD-10-CM

## 2023-09-06 DIAGNOSIS — Z86.006 PERSONAL HISTORY OF MELANOMA IN-SITU: ICD-10-CM

## 2023-09-06 DIAGNOSIS — L90.5 SCAR CONDITIONS AND FIBROSIS OF SKIN: ICD-10-CM

## 2023-09-06 DIAGNOSIS — D22 MELANOCYTIC NEVI: ICD-10-CM

## 2023-09-06 DIAGNOSIS — L82.1 OTHER SEBORRHEIC KERATOSIS: ICD-10-CM

## 2023-09-06 PROBLEM — D22.5 MELANOCYTIC NEVI OF TRUNK: Status: ACTIVE | Noted: 2023-09-06

## 2023-09-06 PROCEDURE — ? OTHER

## 2023-09-06 PROCEDURE — ? COUNSELING

## 2023-09-06 PROCEDURE — 99213 OFFICE O/P EST LOW 20 MIN: CPT

## 2023-09-06 PROCEDURE — ? DIAGNOSIS COMMENT

## 2023-09-06 ASSESSMENT — LOCATION ZONE DERM
LOCATION ZONE: TRUNK
LOCATION ZONE: LEG
LOCATION ZONE: SCALP

## 2023-09-06 ASSESSMENT — LOCATION SIMPLE DESCRIPTION DERM
LOCATION SIMPLE: POSTERIOR SCALP
LOCATION SIMPLE: CHEST
LOCATION SIMPLE: RIGHT PRETIBIAL REGION
LOCATION SIMPLE: RIGHT UPPER BACK
LOCATION SIMPLE: RIGHT ANKLE

## 2023-09-06 ASSESSMENT — LOCATION DETAILED DESCRIPTION DERM
LOCATION DETAILED: STERNAL NOTCH
LOCATION DETAILED: RIGHT MEDIAL DISTAL PRETIBIAL REGION
LOCATION DETAILED: POSTERIOR MID-PARIETAL SCALP
LOCATION DETAILED: RIGHT ANKLE
LOCATION DETAILED: RIGHT MEDIAL UPPER BACK

## 2023-09-06 NOTE — HPI: SKIN LESIONS
How Severe Is Your Skin Lesion?: moderate
Have Your Skin Lesions Been Treated?: not been treated
Is This A New Presentation, Or A Follow-Up?: Skin Lesion
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

## 2023-09-06 NOTE — PROCEDURE: OTHER
Other (Free Text): the dark spots on scalp are small areas of crust on the scar of the MMIS.
Note Text (......Xxx Chief Complaint.): This diagnosis correlates with the
Render Risk Assessment In Note?: no
Detail Level: Zone
Other (Free Text): no change noted in lesion on left nose or right preauricular cheek or right thigh from previous photos.

## 2024-03-05 ENCOUNTER — APPOINTMENT (RX ONLY)
Age: 69
Setting detail: DERMATOLOGY
End: 2024-03-05

## 2024-03-05 DIAGNOSIS — Z86.006 PERSONAL HISTORY OF MELANOMA IN-SITU: ICD-10-CM

## 2024-03-05 DIAGNOSIS — D86.3 SARCOIDOSIS OF SKIN: ICD-10-CM

## 2024-03-05 DIAGNOSIS — L81.4 OTHER MELANIN HYPERPIGMENTATION: ICD-10-CM

## 2024-03-05 DIAGNOSIS — D18.0 HEMANGIOMA: ICD-10-CM

## 2024-03-05 DIAGNOSIS — D22 MELANOCYTIC NEVI: ICD-10-CM

## 2024-03-05 DIAGNOSIS — L21.8 OTHER SEBORRHEIC DERMATITIS: ICD-10-CM

## 2024-03-05 PROBLEM — D22.5 MELANOCYTIC NEVI OF TRUNK: Status: ACTIVE | Noted: 2024-03-05

## 2024-03-05 PROBLEM — D18.01 HEMANGIOMA OF SKIN AND SUBCUTANEOUS TISSUE: Status: ACTIVE | Noted: 2024-03-05

## 2024-03-05 PROCEDURE — ? COUNSELING

## 2024-03-05 PROCEDURE — ? DIAGNOSIS COMMENT

## 2024-03-05 PROCEDURE — 99213 OFFICE O/P EST LOW 20 MIN: CPT

## 2024-03-05 PROCEDURE — ? OTHER

## 2024-03-05 ASSESSMENT — LOCATION SIMPLE DESCRIPTION DERM
LOCATION SIMPLE: RIGHT UPPER BACK
LOCATION SIMPLE: NECK
LOCATION SIMPLE: POSTERIOR SCALP
LOCATION SIMPLE: CHEST
LOCATION SIMPLE: INFERIOR FOREHEAD

## 2024-03-05 ASSESSMENT — LOCATION ZONE DERM
LOCATION ZONE: SCALP
LOCATION ZONE: TRUNK
LOCATION ZONE: NECK
LOCATION ZONE: FACE

## 2024-03-05 ASSESSMENT — LOCATION DETAILED DESCRIPTION DERM
LOCATION DETAILED: STERNAL NOTCH
LOCATION DETAILED: INFERIOR MID FOREHEAD
LOCATION DETAILED: LEFT CENTRAL LATERAL NECK
LOCATION DETAILED: POSTERIOR MID-PARIETAL SCALP
LOCATION DETAILED: RIGHT MEDIAL UPPER BACK

## 2024-03-05 NOTE — PROCEDURE: DIAGNOSIS COMMENT
Comment: mild erythema in glabellar area, using 1% hydrocortisone as needed (not often). Worse with use of soap. Will monitor for now, notify clinic with changes before next visit
Render Risk Assessment In Note?: no
Detail Level: Simple
Comment: Stopped Plaquenil in October and has not had flares. Notify clinic with any recurrences
Comment: the lesion of concern is consistent with a cherry angioma. She has other cherry angiomas on the trunk
Detail Level: Zone

## 2024-03-05 NOTE — PROCEDURE: OTHER
Render Risk Assessment In Note?: no
Other (Free Text): --no change noted in lesion on left nose or right preauricular cheek or right thigh from previous photos.\\n--she will f/u in 6 months, sooner for any new or changing lesions
Note Text (......Xxx Chief Complaint.): This diagnosis correlates with the
Detail Level: Zone
Other (Free Text): there is less crusting of the scar

## 2024-10-28 ENCOUNTER — APPOINTMENT (RX ONLY)
Age: 69
Setting detail: DERMATOLOGY
End: 2024-10-28

## 2024-10-28 DIAGNOSIS — Z86.006 PERSONAL HISTORY OF MELANOMA IN-SITU: ICD-10-CM

## 2024-10-28 DIAGNOSIS — D22 MELANOCYTIC NEVI: ICD-10-CM

## 2024-10-28 DIAGNOSIS — L81.4 OTHER MELANIN HYPERPIGMENTATION: ICD-10-CM

## 2024-10-28 DIAGNOSIS — D49.2 NEOPLASM OF UNSPECIFIED BEHAVIOR OF BONE, SOFT TISSUE, AND SKIN: ICD-10-CM

## 2024-10-28 DIAGNOSIS — L82.1 OTHER SEBORRHEIC KERATOSIS: ICD-10-CM

## 2024-10-28 DIAGNOSIS — D86.3 SARCOIDOSIS OF SKIN: ICD-10-CM

## 2024-10-28 PROBLEM — D22.5 MELANOCYTIC NEVI OF TRUNK: Status: ACTIVE | Noted: 2024-10-28

## 2024-10-28 PROCEDURE — 11102 TANGNTL BX SKIN SINGLE LES: CPT

## 2024-10-28 PROCEDURE — ? COUNSELING

## 2024-10-28 PROCEDURE — ? BIOPSY BY SHAVE METHOD

## 2024-10-28 PROCEDURE — ? DIAGNOSIS COMMENT

## 2024-10-28 PROCEDURE — 99213 OFFICE O/P EST LOW 20 MIN: CPT | Mod: 25

## 2024-10-28 ASSESSMENT — LOCATION ZONE DERM
LOCATION ZONE: SCALP
LOCATION ZONE: TRUNK

## 2024-10-28 ASSESSMENT — LOCATION DETAILED DESCRIPTION DERM
LOCATION DETAILED: RIGHT MEDIAL UPPER BACK
LOCATION DETAILED: STERNAL NOTCH
LOCATION DETAILED: LEFT LATERAL BREAST 2-3:00 REGION
LOCATION DETAILED: POSTERIOR MID-PARIETAL SCALP
LOCATION DETAILED: RIGHT SUPERIOR PARIETAL SCALP

## 2024-10-28 ASSESSMENT — LOCATION SIMPLE DESCRIPTION DERM
LOCATION SIMPLE: LEFT BREAST
LOCATION SIMPLE: SCALP
LOCATION SIMPLE: POSTERIOR SCALP
LOCATION SIMPLE: CHEST
LOCATION SIMPLE: RIGHT UPPER BACK

## 2024-10-28 NOTE — PROCEDURE: BIOPSY BY SHAVE METHOD
Body Location Override (Optional - Billing Will Still Be Based On Selected Body Map Location If Applicable): right top of scalp
Detail Level: Detailed
Depth Of Biopsy: dermis
Was A Bandage Applied: Yes
Size Of Lesion In Cm: 0
Biopsy Type: H and E
Biopsy Method: Personna blade
Anesthesia Type: 1% lidocaine with epinephrine
Anesthesia Volume In Cc: 0.5
Hemostasis: Drysol
Wound Care: Aquaphor
Dressing: bandage
Destruction After The Procedure: No
Type Of Destruction Used: Curettage
Curettage Text: The wound bed was treated with curettage after the biopsy was performed.
Cryotherapy Text: The wound bed was treated with cryotherapy after the biopsy was performed.
Electrodesiccation Text: The wound bed was treated with electrodesiccation after the biopsy was performed.
Electrodesiccation And Curettage Text: The wound bed was treated with electrodesiccation and curettage after the biopsy was performed.
Silver Nitrate Text: The wound bed was treated with silver nitrate after the biopsy was performed.
Lab: [id number]
Consent: Written consent was obtained and risks were reviewed including but not limited to scarring, infection, bleeding, scabbing, incomplete removal, nerve damage and allergy to anesthesia.
Post-Care Instructions: I reviewed in detail post-care instructions with the patient and the handout was provided.
Notification Instructions: Patient will be notified of biopsy results. However, patient instructed to call the office if not contacted within 2 weeks.
Billing Type: Third-Party Bill
Information: Selecting Yes will display possible errors in your note based on the variables you have selected. This validation is only offered as a suggestion for you. PLEASE NOTE THAT THE VALIDATION TEXT WILL BE REMOVED WHEN YOU FINALIZE YOUR NOTE. IF YOU WANT TO FAX A PRELIMINARY NOTE YOU WILL NEED TO TOGGLE THIS TO 'NO' IF YOU DO NOT WANT IT IN YOUR FAXED NOTE.

## 2024-10-28 NOTE — PROCEDURE: DIAGNOSIS COMMENT
Render Risk Assessment In Note?: no
Comment: Stopped Plaquenil in October 2023 and has not had flares. Notify clinic with any recurrences
Detail Level: Simple

## 2025-04-24 ENCOUNTER — APPOINTMENT (OUTPATIENT)
Age: 70
Setting detail: DERMATOLOGY
End: 2025-04-24

## 2025-04-24 DIAGNOSIS — D86.3 SARCOIDOSIS OF SKIN: ICD-10-CM

## 2025-04-24 DIAGNOSIS — L82.1 OTHER SEBORRHEIC KERATOSIS: ICD-10-CM

## 2025-04-24 DIAGNOSIS — Z86.006 PERSONAL HISTORY OF MELANOMA IN-SITU: ICD-10-CM

## 2025-04-24 DIAGNOSIS — D22 MELANOCYTIC NEVI: ICD-10-CM

## 2025-04-24 DIAGNOSIS — Z85.828 PERSONAL HISTORY OF OTHER MALIGNANT NEOPLASM OF SKIN: ICD-10-CM

## 2025-04-24 DIAGNOSIS — L81.4 OTHER MELANIN HYPERPIGMENTATION: ICD-10-CM

## 2025-04-24 PROBLEM — D22.5 MELANOCYTIC NEVI OF TRUNK: Status: ACTIVE | Noted: 2025-04-24

## 2025-04-24 PROCEDURE — 99213 OFFICE O/P EST LOW 20 MIN: CPT

## 2025-04-24 PROCEDURE — ? DIAGNOSIS COMMENT

## 2025-04-24 PROCEDURE — ? COUNSELING

## 2025-04-24 ASSESSMENT — LOCATION SIMPLE DESCRIPTION DERM
LOCATION SIMPLE: POSTERIOR SCALP
LOCATION SIMPLE: RIGHT UPPER BACK
LOCATION SIMPLE: CHEST
LOCATION SIMPLE: LEFT BREAST
LOCATION SIMPLE: SCALP

## 2025-04-24 ASSESSMENT — LOCATION DETAILED DESCRIPTION DERM
LOCATION DETAILED: RIGHT SUPERIOR PARIETAL SCALP
LOCATION DETAILED: LEFT LATERAL BREAST 2-3:00 REGION
LOCATION DETAILED: POSTERIOR MID-PARIETAL SCALP
LOCATION DETAILED: STERNAL NOTCH
LOCATION DETAILED: RIGHT MEDIAL UPPER BACK

## 2025-04-24 ASSESSMENT — LOCATION ZONE DERM
LOCATION ZONE: TRUNK
LOCATION ZONE: SCALP